# Patient Record
Sex: FEMALE | Race: OTHER | URBAN - METROPOLITAN AREA
[De-identification: names, ages, dates, MRNs, and addresses within clinical notes are randomized per-mention and may not be internally consistent; named-entity substitution may affect disease eponyms.]

---

## 2019-01-30 ENCOUNTER — OFFICE VISIT (OUTPATIENT)
Dept: FAMILY MEDICINE CLINIC | Age: 39
End: 2019-01-30

## 2019-01-30 ENCOUNTER — HOSPITAL ENCOUNTER (OUTPATIENT)
Dept: LAB | Age: 39
Discharge: HOME | End: 2019-01-30

## 2019-01-30 VITALS
SYSTOLIC BLOOD PRESSURE: 134 MMHG | TEMPERATURE: 98.9 F | DIASTOLIC BLOOD PRESSURE: 78 MMHG | BODY MASS INDEX: 25.69 KG/M2 | WEIGHT: 145 LBS | HEIGHT: 63 IN | HEART RATE: 70 BPM

## 2019-01-30 DIAGNOSIS — R10.2 PELVIC PAIN: Primary | ICD-10-CM

## 2019-01-30 DIAGNOSIS — R10.2 PELVIC PAIN: ICD-10-CM

## 2019-01-30 LAB
APPEARANCE UR: ABNORMAL
BACTERIA URNS QL MICRO: NEGATIVE /HPF
BILIRUB UR QL STRIP: NEGATIVE
BILIRUB UR QL: NEGATIVE
COLOR UR: ABNORMAL
EPITH CASTS URNS QL MICRO: ABNORMAL /LPF
GLUCOSE UR STRIP.AUTO-MCNC: NEGATIVE MG/DL
GLUCOSE UR-MCNC: NEGATIVE MG/DL
HCG URINE, QL. (POC): NEGATIVE
HGB UR QL STRIP: ABNORMAL
HYALINE CASTS URNS QL MICRO: ABNORMAL /LPF (ref 0–5)
KETONES P FAST UR STRIP-MCNC: NORMAL MG/DL
KETONES UR QL STRIP.AUTO: NEGATIVE MG/DL
LEUKOCYTE ESTERASE UR QL STRIP.AUTO: NEGATIVE
NITRITE UR QL STRIP.AUTO: NEGATIVE
PH UR STRIP: 5 [PH] (ref 4.6–8)
PH UR STRIP: 6 [PH] (ref 5–8)
PROT UR QL STRIP: NEGATIVE
PROT UR STRIP-MCNC: NEGATIVE MG/DL
RBC #/AREA URNS HPF: ABNORMAL /HPF (ref 0–5)
SP GR UR REFRACTOMETRY: 1.02 (ref 1–1.03)
SP GR UR STRIP: 1.01 (ref 1–1.03)
UA UROBILINOGEN AMB POC: NORMAL (ref 0.2–1)
URINALYSIS CLARITY POC: NORMAL
URINALYSIS COLOR POC: YELLOW
URINE BLOOD POC: NORMAL
URINE LEUKOCYTES POC: NEGATIVE
URINE NITRITES POC: NEGATIVE
UROBILINOGEN UR QL STRIP.AUTO: 0.2 EU/DL (ref 0.2–1)
VALID INTERNAL CONTROL?: YES
WBC URNS QL MICRO: ABNORMAL /HPF (ref 0–4)

## 2019-01-30 PROCEDURE — 81001 URINALYSIS AUTO W/SCOPE: CPT

## 2019-01-30 PROCEDURE — 87491 CHLMYD TRACH DNA AMP PROBE: CPT

## 2019-01-30 NOTE — PROGRESS NOTES
Results for orders placed or performed in visit on 01/30/19 AMB POC URINALYSIS DIP STICK MANUAL W/O MICRO Result Value Ref Range Color (UA POC) Yellow Clarity (UA POC) Cloudy Glucose (UA POC) Negative Negative Bilirubin (UA POC) Negative Negative Ketones (UA POC) Trace Negative Specific gravity (UA POC) 1.015 1.001 - 1.035 Blood (UA POC) 2+ Negative pH (UA POC) 5.0 4.6 - 8.0 Protein (UA POC) Negative Negative Urobilinogen (UA POC) normal 0.2 - 1 Nitrites (UA POC) Negative Negative Leukocyte esterase (UA POC) Negative Negative AMB POC URINE PREGNANCY TEST, VISUAL COLOR COMPARISON Result Value Ref Range VALID INTERNAL CONTROL POC Yes HCG urine, Ql. (POC) Negative Negative

## 2019-01-30 NOTE — PROGRESS NOTES
Subjective: Chief Complaint Patient presents with  Mass  
  pt c/o mass on top of head x3 months  Abdominal Pain  
  pt c/o pain in abdomen x6 months  
 
she is a 34y.o. year old female who presents for evalution. New pt, main concern is that when she rolls over at night onto her right side, her right pelvic area hurts and when she rolls over to the left, her left pelvis hurts. Also tender to the touch. Sx for 6 months, not progressive but daily. , h/o btl. 1 C/s, no other abd/pelvic surgeries. lmp 19, nl. No urinary or bowel c/o.  occas dyspareunia. Reports nl pap 2 yr ago and nl mammo 2 yrs ago. Also c/o a growing mass on head for the last 3 months, hurts ocaas a little. SH--  Lives with  and children. No sex outside the marriage, denies h/o being victimized by dv. PMH-- none except above. Objective:  
 
Vitals:  
 19 0907 BP: 134/78 Pulse: 70 Temp: 98.9 °F (37.2 °C) TempSrc: Oral  
Weight: 145 lb (65.8 kg) Height: 5' 3.19\" (1.605 m) Physical Examination: General appearance - alert, well appearing, and in no distress Neck - supple, no significant adenopathy, thyroid exam: thyroid is normal in size without nodules or tenderness Chest - clear to auscultation, no wheezes, rales or rhonchi, symmetric air entry Heart - normal rate, regular rhythm, normal S1, S2, no murmurs, rubs, clicks or gallops Abdomen - soft, nontender, nondistended, no masses or organomegaly Pelvic - VULVA: normal appearing vulva with no masses, tenderness or lesions, UTERUS: uterus is normal size, shape, consistency and nontender, ADNEXA: normal adnexa in size, nontender and no masses, exam limited by my inability to do speculum exam in the [facility] Skin - ~6mm smooth round subcut mass upper right occiput, NT with no fluctuance, soft. UA 2+ blood, not on menses. Assessment/ Plan: The encounter diagnosis was Pelvic pain.   Normal exam, needs pap and speculum exam.  Refer to [facility] for these, may need U/s if sx persist, particularly if increasing. 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 urien sent for chl/gc and UA. No results found for any visits on 01/30/19. Orders Placed This Encounter  CHLAMYDIA/GC PCR Standing Status:   Future Standing Expiration Date:   7/30/2019 Order Specific Question:   Sample source Answer:   Urine [258] Order Specific Question:   Specimen source Answer:   Urine [258] Follow-up Disposition: Not on File

## 2019-01-30 NOTE — PROGRESS NOTES
Printed AVS, provided to pt and reviewed. Pt indicated understanding and had no questions. Provider provided pt with information and phone # for [facility]. Explained that they will do a financial screening before scheduling appt. The pt had no questions regarding [facility]. Pt told provider would like for her to return in 3 month's or sooner if th elump on her head started getting any bigger. Karthik Carbajal was the .  Karey Carlton RN

## 2019-02-01 LAB
C TRACH DNA SPEC QL NAA+PROBE: NEGATIVE
N GONORRHOEA DNA SPEC QL NAA+PROBE: NEGATIVE
SAMPLE TYPE: NORMAL
SERVICE CMNT-IMP: NORMAL
SPECIMEN SOURCE: NORMAL

## 2019-02-04 ENCOUNTER — TELEPHONE (OUTPATIENT)
Dept: FAMILY MEDICINE CLINIC | Age: 39
End: 2019-02-04

## 2019-02-04 DIAGNOSIS — R31.9 HEMATURIA, UNSPECIFIED TYPE: Primary | ICD-10-CM

## 2019-02-04 NOTE — TELEPHONE ENCOUNTER
Apparently [facility] would not give her appt for pap, only mammo. Can the [facility] schedule her for pap at our clinic?

## 2019-02-04 NOTE — PROGRESS NOTES
Discussed with pt that she needs to come in for another UA with micro, this one needs to be clean catch. I am not sure how to communicate this to the MA but will try putting in an encounter note.

## 2019-02-19 ENCOUNTER — HOSPITAL ENCOUNTER (OUTPATIENT)
Dept: LAB | Age: 39
Discharge: HOME | End: 2019-02-19

## 2019-02-19 LAB
APPEARANCE UR: CLEAR
BACTERIA URNS QL MICRO: NEGATIVE /HPF
BILIRUB UR QL: NEGATIVE
COLOR UR: ABNORMAL
EPITH CASTS URNS QL MICRO: ABNORMAL /LPF
GLUCOSE UR STRIP.AUTO-MCNC: NEGATIVE MG/DL
HGB UR QL STRIP: ABNORMAL
HYALINE CASTS URNS QL MICRO: ABNORMAL /LPF (ref 0–5)
KETONES UR QL STRIP.AUTO: NEGATIVE MG/DL
LEUKOCYTE ESTERASE UR QL STRIP.AUTO: NEGATIVE
NITRITE UR QL STRIP.AUTO: NEGATIVE
PH UR STRIP: 6 [PH] (ref 5–8)
PROT UR STRIP-MCNC: NEGATIVE MG/DL
RBC #/AREA URNS HPF: ABNORMAL /HPF (ref 0–5)
SP GR UR REFRACTOMETRY: 1.01 (ref 1–1.03)
UROBILINOGEN UR QL STRIP.AUTO: 0.2 EU/DL (ref 0.2–1)
WBC URNS QL MICRO: ABNORMAL /HPF (ref 0–4)

## 2019-02-19 PROCEDURE — 81001 URINALYSIS AUTO W/SCOPE: CPT

## 2019-02-22 NOTE — PROGRESS NOTES
Patient did return for a lab appt. On 2/19/2019, results review by provider pending for that specimen.  Vaishali Gatica RN

## 2019-04-24 ENCOUNTER — OFFICE VISIT (OUTPATIENT)
Dept: FAMILY MEDICINE CLINIC | Age: 39
End: 2019-04-24

## 2019-04-24 VITALS
BODY MASS INDEX: 24.72 KG/M2 | WEIGHT: 140.4 LBS | SYSTOLIC BLOOD PRESSURE: 108 MMHG | HEART RATE: 76 BPM | TEMPERATURE: 97.8 F | DIASTOLIC BLOOD PRESSURE: 71 MMHG

## 2019-04-24 DIAGNOSIS — G89.29 CHRONIC PAIN OF BOTH SHOULDERS: Primary | ICD-10-CM

## 2019-04-24 DIAGNOSIS — M25.512 CHRONIC PAIN OF BOTH SHOULDERS: Primary | ICD-10-CM

## 2019-04-24 DIAGNOSIS — M25.511 CHRONIC PAIN OF BOTH SHOULDERS: Primary | ICD-10-CM

## 2019-04-24 RX ORDER — DICLOFENAC SODIUM 10 MG/G
2 GEL TOPICAL 3 TIMES DAILY
Qty: 100 G | Refills: 2 | Status: SHIPPED | OUTPATIENT
Start: 2019-04-24

## 2019-04-24 NOTE — PROGRESS NOTES
At discharge station AVS was printed and reviewed with pt with Bertin Peña as . Pt given Good [facility]  today for medication. Pt taken to registration to make return appointments as directed by provider today. JOLYNN Martinez

## 2019-04-24 NOTE — PROGRESS NOTES
HISTORY OF PRESENT ILLNESS Ady Lawson is a 34 y.o. female. HPI Patient states she had 2 bumps in the head. Patient states that it has not gotten larger or smaller and it does not give her any pain. The shoulders are uncomfortable, both sides but the left one is worse. This has been going on for 4 months. She has been trying to exercise but it has not gotten better. States that she had been going to the gym and this exacerbated the pain. Review of Systems Constitutional: Negative for chills, fever and weight loss. HENT: Negative for ear pain, hearing loss and tinnitus. Respiratory: Negative for cough, shortness of breath and wheezing. Cardiovascular: Negative for chest pain and palpitations. Gastrointestinal: Negative for abdominal pain, constipation, diarrhea, heartburn, nausea and vomiting. Genitourinary: Negative for dysuria, frequency and urgency. Musculoskeletal: Positive for joint pain. Bilateral shoulder pain Skin: Negative for itching and rash. /71 (BP 1 Location: Left arm, BP Patient Position: Sitting)   Pulse 76   Temp 97.8 °F (36.6 °C) (Oral)   Wt 140 lb 6.4 oz (63.7 kg)   LMP 04/16/2019   BMI 24.72 kg/m² Physical Exam  
HENT:  
There is 2 small masses in the parietoccipital area, non tender, rubbery consistency Cardiovascular: Normal rate, regular rhythm and normal heart sounds. No murmur heard. Pulmonary/Chest: Effort normal and breath sounds normal.  
Abdominal: Soft. Bowel sounds are normal. She exhibits no distension. There is no tenderness. There is no rebound. Musculoskeletal:  
Both shoulder show pain to overhead ROM, lift off negative, + empty can test  
 
 
ASSESSMENT and PLAN Diagnoses and all orders for this visit: 
 
1. Chronic pain of both shoulders 
-     diclofenac (VOLTAREN) 1 % gel; Apply 2 g to affected area three (3) times daily. Both shoulders x 10 days if symptoms persist, patient may return in 3-4 weeks for injection

## 2019-04-24 NOTE — PROGRESS NOTES
Coordination of Care 1. Have you been to the ER, urgent care clinic since your last visit? Hospitalized since your last visit? No 
 
2. Have you seen or consulted any other health care providers outside of the [address] [place] [address] since your last visit? Include any pap smears or colon screening. No 
 
Does the patient need refills? NO Learning Assessment Complete? yes

## 2019-06-04 ENCOUNTER — TELEPHONE (OUTPATIENT)
Dept: FAMILY MEDICINE CLINIC | Age: 39
End: 2019-06-04

## 2019-06-13 ENCOUNTER — TELEPHONE (OUTPATIENT)
Dept: FAMILY PLANNING/WOMEN'S HEALTH CLINIC | Age: 39
End: 2019-06-13

## 2019-06-13 NOTE — TELEPHONE ENCOUNTER
VM left for pt via [id number] . She was a no show for her 6/5/19 [facility] appt, # left if she wishes to reschedule.

## 2024-10-15 ENCOUNTER — TRANSCRIBE ORDERS (OUTPATIENT)
Facility: HOSPITAL | Age: 44
End: 2024-10-15

## 2024-10-15 DIAGNOSIS — N63.0 BREAST LUMP IN FEMALE: Primary | ICD-10-CM

## 2025-04-30 ENCOUNTER — TELEPHONE (OUTPATIENT)
Age: 45
End: 2025-04-30

## 2025-04-30 NOTE — TELEPHONE ENCOUNTER
Conchis called to scheduled her mammogram and pap smear per. Pt was advised by [facility] dept to scheduled with [facility].  She stated that she has had a cyst on her left breast for at least 5 years. Conchis stated that the cyst was found on a mammogram 5 years ago. Patient advised it has gotten bigger and is sore. Please advise.    Patient has been screened for [facility] eligibility. Patient speaks Portuguese.     Thank you.

## 2025-05-09 NOTE — TELEPHONE ENCOUNTER
[facility]  [phone]    05/09/25 2:33 PM called patient, no answer, left message requesting a call back.. Renetta Ny RN

## 2025-08-13 ENCOUNTER — TELEPHONE (OUTPATIENT)
Age: 45
End: 2025-08-13

## 2025-08-14 ENCOUNTER — TRANSCRIBE ORDERS (OUTPATIENT)
Facility: HOSPITAL | Age: 45
End: 2025-08-14

## 2025-08-14 DIAGNOSIS — Z12.31 VISIT FOR SCREENING MAMMOGRAM: Primary | ICD-10-CM

## 2025-08-18 ENCOUNTER — HOSPITAL ENCOUNTER (OUTPATIENT)
Facility: HOSPITAL | Age: 45
Discharge: HOME | End: 2025-08-21
Attending: FAMILY MEDICINE

## 2025-08-18 ENCOUNTER — OFFICE VISIT (OUTPATIENT)
Age: 45
End: 2025-08-18

## 2025-08-18 ENCOUNTER — HOSPITAL ENCOUNTER (OUTPATIENT)
Facility: HOSPITAL | Age: 45
Setting detail: SPECIMEN
Discharge: HOME | End: 2025-08-21

## 2025-08-18 DIAGNOSIS — N63.15 MASS OVERLAPPING MULTIPLE QUADRANTS OF RIGHT BREAST: ICD-10-CM

## 2025-08-18 DIAGNOSIS — N63.25 MASS OVERLAPPING MULTIPLE QUADRANTS OF LEFT BREAST: ICD-10-CM

## 2025-08-18 DIAGNOSIS — Z12.31 VISIT FOR SCREENING MAMMOGRAM: ICD-10-CM

## 2025-08-18 DIAGNOSIS — Z12.4 CERVICAL CANCER SCREENING: Primary | ICD-10-CM

## 2025-08-18 DIAGNOSIS — N84.1 CERVICAL POLYP: ICD-10-CM

## 2025-08-19 ENCOUNTER — TELEPHONE (OUTPATIENT)
Age: 45
End: 2025-08-19

## 2025-08-19 DIAGNOSIS — N63.25 MASS OVERLAPPING MULTIPLE QUADRANTS OF LEFT BREAST: ICD-10-CM

## 2025-08-19 DIAGNOSIS — N63.15 MASS OVERLAPPING MULTIPLE QUADRANTS OF RIGHT BREAST: Primary | ICD-10-CM

## 2025-08-20 LAB — HPV I/H RISK 1 DNA CVX QL PROBE+SIG AMP: NEGATIVE

## 2025-08-25 DIAGNOSIS — N63.15 MASS OVERLAPPING MULTIPLE QUADRANTS OF RIGHT BREAST: Primary | ICD-10-CM

## 2025-08-25 DIAGNOSIS — N63.25 MASS OVERLAPPING MULTIPLE QUADRANTS OF LEFT BREAST: ICD-10-CM

## 2025-08-27 ENCOUNTER — RESULTS FOLLOW-UP (OUTPATIENT)
Age: 45
End: 2025-08-27